# Patient Record
Sex: MALE | Race: WHITE
[De-identification: names, ages, dates, MRNs, and addresses within clinical notes are randomized per-mention and may not be internally consistent; named-entity substitution may affect disease eponyms.]

---

## 2018-09-09 ENCOUNTER — HOSPITAL ENCOUNTER (EMERGENCY)
Dept: HOSPITAL 80 - FED | Age: 48
Discharge: HOME | End: 2018-09-09
Payer: SELF-PAY

## 2018-09-09 ENCOUNTER — HOSPITAL ENCOUNTER (EMERGENCY)
Dept: HOSPITAL 80 - FED | Age: 48
Discharge: LEFT BEFORE DISCHARGE | End: 2018-09-09
Payer: SELF-PAY

## 2018-09-09 VITALS — DIASTOLIC BLOOD PRESSURE: 96 MMHG | SYSTOLIC BLOOD PRESSURE: 154 MMHG

## 2018-09-09 VITALS — DIASTOLIC BLOOD PRESSURE: 90 MMHG | SYSTOLIC BLOOD PRESSURE: 146 MMHG

## 2018-09-09 DIAGNOSIS — F10.20: ICD-10-CM

## 2018-09-09 DIAGNOSIS — F10.920: Primary | ICD-10-CM

## 2018-09-09 DIAGNOSIS — F17.200: ICD-10-CM

## 2018-09-09 DIAGNOSIS — I10: ICD-10-CM

## 2018-09-09 NOTE — EDPHY
H & P


Stated Complaint: wants detox


Time Seen by Provider: 09/09/18 21:52


HPI/ROS: 





CHIEF COMPLAINT:  Intoxication





HISTORY OF PRESENT ILLNESS:  Patient is a 48-year-old man who is visiting from 

out of town but will not say from where.  He was out drinking tonight when some 

"people" became concerned and brought him to the ER.  He has slurred speech.  

He is able to ambulate safely.  He denies, ingestants.  He denies having any 

significant medical history.


Severity:  Moderate


Modifying factors:  None





REVIEW OF SYSTEMS:


Unable to obtain secondary to condition





Physical Exam





General Appearance:  WD/WN, no apparent distress, slurred speech but answers 

questions appropriately


EENT:  PERRL/EOMI, normal ENT inspection, TMs normal, pharynx normal


Neck:  non-tender, full range of motion, supple, normal inspection


Respiratory:  chest non-tender, lungs clear, normal breath sounds


Cardiac/Chest:  normal peripheral pulses, regular rate, rhythm, P


Peripheral Pulses:  2+:  carotid (R), carotid (L), femoral (R), femoral (L), 

dorsalis-pedis (R), dorsalis-pedis (L)


Abdomen:  normal bowel sounds, non-tender, soft


Extremities:  normal range of motion, non-tender, normal inspection, normal 

capillary refill


Neurological:  calm, CNs II-XII NML as tested. 


Appearance:  appropriate appearance, appropriate insight, neat, denies illness


Behavior/Eye Contact/Speech:  cooperative, decreased rate of speech


Thoughts/Hallucinations:  normal thought pattern, no apparent hallucination


Skin:  normal color, warm/dry





Source: Patient


Exam Limitations: No limitations





- Personal History


Current Tetanus Diphtheria and Acellular Pertussis (TDAP): Unsure





- Medical/Surgical History


Hx Asthma: No


Hx Chronic Respiratory Disease: No


Hx Diabetes: No


Hx Cardiac Disease: No


Hx Renal Disease: No


Hx Cirrhosis: No


Hx Alcoholism: No


Hx HIV/AIDS: No


Hx Splenectomy or Spleen Trauma: No


Other PMH: alcohol, hypertension





- Family History


Significant Family History: No pertinent family hx





- Social History


Smoking Status: Current every day smoker


Alcohol Use: None


Drug Use: None


Constitutional: 


 Initial Vital Signs











Temperature (C)  36.6 C   09/09/18 21:33


 


Heart Rate  90   09/09/18 21:33


 


Respiratory Rate  20   09/09/18 21:33


 


Blood Pressure  154/96 H  09/09/18 21:33


 


O2 Sat (%)  94   09/09/18 21:33








 











O2 Delivery Mode               Room Air














Allergies/Adverse Reactions: 


 





No Known Allergies Allergy (Unverified 09/09/18 22:27)


 








Home Medications: 














 Medication  Instructions  Recorded


 


Bactrim DS  09/09/18


 


Lisinopril  09/09/18


 


Wellbutrin Sr  09/09/18














Medical Decision Making


ED Course/Re-evaluation: 





10:00 p.m. After I left the room the patient got up and walked out.  He was 

ambulating steadily and did not stop when we asked him to stop.  He was not on 

a hold.  He is not driving.


Differential Diagnosis: 





Partial list of the Differential diagnosis considered include but were not 

limited to;  intoxication, substance abuse and although unlikely based on the 

history and physical exam, I also considered head injury, infection.





Departure





- Departure


Disposition: Against Medical Advice


Clinical Impression: 


Alcoholic intoxication


Qualifiers:


 Complication of substance-induced condition: uncomplicated Qualified Code(s): 

F10.920 - Alcohol use, unspecified with intoxication, uncomplicated





Condition: Fair


Referrals: 


NONE *PRIMARY CARE P,. [Primary Care Provider] - As per Instructions

## 2018-09-09 NOTE — EDPHY
H & P


Stated Complaint: requesting detox


Time Seen by Provider: 09/09/18 22:31


HPI/ROS: 





CHIEF COMPLAINT:  Intoxication





HISTORY OF PRESENT ILLNESS:  Patient is a 48-year-old man visiting from out of 

town who returns requesting detox.  States that he is an alcoholic.  I saw him 

here about an hour ago after strangers brought him for public intoxication.  He 

denies injury.  He will not provide much medical history.  He states that he 

has had withdrawal problems with foreign will need medications.  No current 

injuries.  He was drinking up to about an hour ago.  He was able to ambulate 

without difficulty and eloped earlier from the department.


Severity:  Moderate


Modifying factors:  The none





REVIEW OF SYSTEMS:


Unable to obtain secondary to condition





EXAM:


GENERAL:  Well-appearing, well-nourished and in no acute distress.


HEAD:  Atraumatic, normocephalic.


EYES:  Pupils equal round and reactive to light, extraocular movements intact, 

sclera anicteric, conjunctiva are normal.


ENT:  TMs normal, nares patent, oropharynx clear without exudates.  Moist 

mucous membranes.


NECK:  Normal range of motion, supple without lymphadenopathy or JVD.


LUNGS:  Breath sounds clear to auscultation bilaterally and equal.  No wheezes 

rales or rhonchi.


HEART:  Regular rate and rhythm without murmurs, rubs or gallops.


ABDOMEN:  Soft, nontender, normoactive bowel sounds.  No guarding, no rebound.  

No masses appreciated. 


BACK:  No CVA tenderness, no spinal tenderness, step-offs or deformities


EXTREMITIES:  Normal range of motion, no pitting or edema.  No clubbing or 

cyanosis.


NEUROLOGICAL:  Cranial nerves II through XII grossly intact.  Normal speech, 

normal gait.  5/5 strength, normal movement in all extremities, normal sensation

, normal reflexes


PSYCH:  Normal mood, normal affect.


SKIN:  Warm, dry, normal turgor, no visible rashes or lesions.








Source: Patient


Exam Limitations: Intoxication





- Personal History


Current Tetanus Diphtheria and Acellular Pertussis (TDAP): Yes





- Medical/Surgical History


Hx Asthma: No


Hx Chronic Respiratory Disease: No


Hx Diabetes: No


Hx Cardiac Disease: No


Hx Renal Disease: No


Hx Cirrhosis: No


Hx Alcoholism: No


Hx HIV/AIDS: No


Hx Splenectomy or Spleen Trauma: No


Other PMH: alcohol, hypertension





- Social History


Smoking Status: Current every day smoker


Constitutional: 


 Initial Vital Signs











Temperature (C)  36.6 C   09/09/18 22:28


 


Heart Rate  93   09/09/18 22:28


 


Respiratory Rate  16   09/09/18 22:28


 


Blood Pressure  146/90 H  09/09/18 22:28


 


O2 Sat (%)  94   09/09/18 22:28








 











O2 Delivery Mode               Room Air














Allergies/Adverse Reactions: 


 





No Known Allergies Allergy (Unverified 09/09/18 22:27)


 








Home Medications: 














 Medication  Instructions  Recorded


 


Bactrim DS  09/09/18


 


Lisinopril  09/09/18


 


Wellbutrin Sr  09/09/18














Medical Decision Making


ED Course/Re-evaluation: 





The patient is ambulating without difficulty.  He [facility].  He states that he has had trouble with withdrawals in the 

past.  Will send him with Librium pack.  He is still intoxicated is not yet 

withdrawing.


Differential Diagnosis: 





Partial list of the Differential diagnosis considered include but were not 

limited to;  intoxication, withdrawal and although unlikely based on the 

history and physical exam, I also considered head injury, infection.  





- Data Points


Medications Given: 


 








Discontinued Medications





Chlordiazepoxide (Librium 25 Mg Prepack#6)  [facility]


   Stop: 09/09/18 22:43


   Last Admin: 09/09/18 23:24 Dose:  Not Given








Departure





- Departure


Disposition: Home, Routine, Self-Care


Clinical Impression: 


Alcoholic intoxication


Qualifiers:


 Complication of substance-induced condition: uncomplicated Qualified Code(s): 

F10.920 - Alcohol use, unspecified with intoxication, uncomplicated





Condition: Fair


Instructions:  Chlordiazepoxide (By mouth)


Referrals: 


NONE *PRIMARY CARE P,. [Primary Care Provider] - As per Instructions


Alejandra Campbell MD [[facility] [place]] - As per Instructions